# Patient Record
Sex: MALE | Race: WHITE | NOT HISPANIC OR LATINO
[De-identification: names, ages, dates, MRNs, and addresses within clinical notes are randomized per-mention and may not be internally consistent; named-entity substitution may affect disease eponyms.]

---

## 2023-08-02 ENCOUNTER — APPOINTMENT (OUTPATIENT)
Dept: COLORECTAL SURGERY | Facility: CLINIC | Age: 43
End: 2023-08-02
Payer: COMMERCIAL

## 2023-08-02 VITALS
DIASTOLIC BLOOD PRESSURE: 70 MMHG | WEIGHT: 185 LBS | SYSTOLIC BLOOD PRESSURE: 120 MMHG | BODY MASS INDEX: 30.82 KG/M2 | HEIGHT: 65 IN | TEMPERATURE: 36.9 F | RESPIRATION RATE: 12 BRPM | HEART RATE: 58 BPM

## 2023-08-02 DIAGNOSIS — Z83.49 FAMILY HISTORY OF OTHER ENDOCRINE, NUTRITIONAL AND METABOLIC DISEASES: ICD-10-CM

## 2023-08-02 DIAGNOSIS — Z78.9 OTHER SPECIFIED HEALTH STATUS: ICD-10-CM

## 2023-08-02 DIAGNOSIS — F19.91 OTHER PSYCHOACTIVE SUBSTANCE USE, UNSPECIFIED, IN REMISSION: ICD-10-CM

## 2023-08-02 DIAGNOSIS — F17.200 NICOTINE DEPENDENCE, UNSPECIFIED, UNCOMPLICATED: ICD-10-CM

## 2023-08-02 DIAGNOSIS — Z80.0 FAMILY HISTORY OF MALIGNANT NEOPLASM OF DIGESTIVE ORGANS: ICD-10-CM

## 2023-08-02 DIAGNOSIS — K61.1 RECTAL ABSCESS: ICD-10-CM

## 2023-08-02 PROBLEM — Z00.00 ENCOUNTER FOR PREVENTIVE HEALTH EXAMINATION: Status: ACTIVE | Noted: 2023-08-02

## 2023-08-02 PROCEDURE — 99242 OFF/OP CONSLTJ NEW/EST SF 20: CPT

## 2023-08-02 RX ORDER — PSYLLIUM HUSK 0.4 G
CAPSULE ORAL
Refills: 0 | Status: ACTIVE | COMMUNITY

## 2023-08-04 ENCOUNTER — APPOINTMENT (OUTPATIENT)
Dept: COLORECTAL SURGERY | Facility: HOSPITAL | Age: 43
End: 2023-08-04
Payer: COMMERCIAL

## 2023-08-04 PROCEDURE — XXXXX: CPT

## 2023-08-10 NOTE — HISTORY OF PRESENT ILLNESS
[FreeTextEntry1] : 43yo WM developed painful perianal tissue swelling (10/10) @5days ago. Small amt blood on toilet tissue. Seen at Urgent Care on Monday. Dxd with ??hemorrhoid. Given Rx for Hydrocort suppositoris (no relief). Seen by Dr Caruso this am. Possible perirectal abscess. Advised to schedule colorectal evaluation.  Daily BM. Denies abdominal pain, rectal bleeding.  Denies FH Colon CA/IBD.

## 2023-08-10 NOTE — PHYSICAL EXAM
[Normal Heart Sounds] : normal heart sounds [Normal Rate and Rhythm] : normal rate and rhythm [No Edema] : No edema [Alert] : alert [Oriented to Person] : oriented to person [Oriented to Place] : oriented to place [Oriented to Time] : oriented to time [Calm] : calm [de-identified] : round soft +BS NT/ND [de-identified] : NC/AT [de-identified] : +ROM [de-identified] : intact

## 2023-08-10 NOTE — ASSESSMENT
[FreeTextEntry1] : Pleasant 42-year-old gentleman who presents with a chief complaint of anorectal discomfort.  Approximately 3 to 4 days ago the patient noted some increasing perianal discomfort.  This prompted him to go to an outpatient clinic where he was treated with hemorrhoidal suppositories for a supposed hemorrhoidal exacerbation.  Since the pain worsened he recently saw Dr. Caruso of gastroenterology. The doctor is concerned that he may have a perianal abscess.  The patient denies any prodromal symptomatology of chronic abdominal discomfort, diarrhea or bleeding.  Inspection of the anorectal area reveals some slight erythema in the left anterior position.  Palpation reveals induration in the left anterior position.  No further examination is done due to patient discomfort.  I believe the patient has a perirectal abscess and requires examination under anesthesia and incision and drainage of the perirectal abscess.  He was explained that this procedure may in fact lead to an anal fistula which would then require additional operative procedure or procedures.  Questions were answered and he understands the necessity for semi-urgent booking.

## 2023-09-06 ENCOUNTER — APPOINTMENT (OUTPATIENT)
Dept: COLORECTAL SURGERY | Facility: CLINIC | Age: 43
End: 2023-09-06
Payer: COMMERCIAL

## 2023-09-06 PROCEDURE — 99024 POSTOP FOLLOW-UP VISIT: CPT

## 2023-09-06 NOTE — HISTORY OF PRESENT ILLNESS
[FreeTextEntry1] : 43-year-old male status post fistulotomy recovering well. He has excellent control to both stool and gas. He reports minimal drainage.

## 2023-09-06 NOTE — ASSESSMENT
[FreeTextEntry1] : 43-year-old male status post fistulotomy recovering well  Followup as needed or at 47 years of age to schedule colonoscopy